# Patient Record
(demographics unavailable — no encounter records)

---

## 2020-05-18 NOTE — RAD
LEFT SHOULDER 3 VIEWS:

 

HISTORY: 

Fall, left shoulder pain.

 

FINDINGS/IMPRESSION: 

There are fractures involving the left humeral neck and greater tuberosity with minimal displacement.


 

POS: [facility]

## 2020-05-18 NOTE — RAD
Radiograph left elbow 4 views:



DATE:

5/18/2020



HISTORY:

64-year-old female with acute traumatic elbow pain after fall



FINDINGS:

There is no fracture or dislocation.



IMPRESSION:

Negative



Reported By: Jose Nicole 

Electronically Signed:  5/18/2020 10:37 AM

## 2020-05-18 NOTE — RAD
Exam: One view chest

3 views left RIBS



HISTORY: Fall from standing.



FINDINGS: Chest one view: Sternotomy wires. Normal cardiac silhouette. Pleural spaces are clear. 
donald lung parenchymal changes. 1.3 cm nodule in the right lung. Nodule is present on a previous CT

from 4/14/2017. No consolidation. No pneumothorax. Left humeral neck fracture

Left rib series: With regard to the left ribs, no fracture, cortical irregularity or periosteal react
ion.



IMPRESSION:

1. No acute cardiopulmonary process.

2. Left humeral neck fracture.



Reported By: Teo Del Cid 

Electronically Signed:  5/18/2020 9:58 PM